# Patient Record
(demographics unavailable — no encounter records)

---

## 2025-04-22 NOTE — BIRTH HISTORY
[At Term] : at term [Normal Vaginal Route] : by normal vaginal route [FreeTextEntry1] : 8 pounds 5 ounces.

## 2025-04-22 NOTE — REASON FOR VISIT
[Initial Consultation] : an initial consultation for [Hyperactivity] : hyperactivity [Attention Problems] : attention problems [Learning Problems] : learning problems [Mother] : mother [Father] : father [IEP] : IEP [Rating scales] : rating scales [FreeTextEntry2] : mOTHER HAS CONCERNS  regarding his focus and attention difficulties. He has limited attention and needs to be constantly refocusesed. Parents are seeking evaluation and recommendations for interventions that can help with the improvment of his focus and  control of his hyperactive/impulsive  behaviors.

## 2025-04-22 NOTE — SOCIAL HISTORY
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

## 2025-04-22 NOTE — HISTORY OF PRESENT ILLNESS
[Public] : Public [ICT: _____] : Integrated Co-teaching class (Collaborative Team Teaching) [unfilled] [IEP] : Individualized Education Program [OT: ____] : Occupational Therapy [unfilled] [S-L: _____] : Speech/Language Therapy [unfilled] [Difficulty focusing in class] : difficulty focusing in class [Easily distracted] : easily distracted [Needs frequent redirection to finish tasks] : needs frequent redirection to finish tasks [Difficulty completing homework, needs supervision] : difficulty completing homework, needs supervision [Instructions often need to be repeated several times] : instructions often need to be repeated several times [Often loses belongings, misplaces books/assignments] : often loses belongings, misplaces books and/or assignments [Difficulty sitting still in class] : difficulty sitting still in class [Restless, fidgety] : restless, fidgety [Often playing with objects in hands] : often playing with objects in hands [Always "on the go"] : always "on the go" [Calls out in class, doesn't raise hand] : calls out in class, doesn't raise hand [Impatient, has trouble waiting for turn] : impatient, has trouble waiting for turn [Struggling academically] : struggling academically [Difficulty with math] : difficulty with math [Handwriting is sloppy or illegible] : handwriting is sloppy or illegible [Trouble expressing thoughts in writing] : trouble expressing thoughts in writing [Reads well, but has trouble with comprehension] : reads well, but has trouble with comprehension [Gets along well with other children] : gets along well with other children [Delayed Speech] : delayed speech [Delays in motor skills] : delays in motor skills [Poor handwriting] : poor handwriting [Plays with a variety of toys] :  plays with a variety of toys [Gets upset with changes in routines] : gets upset with changes in routines [Often seeks activity] : often seeks activity [Difficulty with Toilet training] : difficulty with toilet training [Becomes fixated on specific topics or interests for a period of time] : becomes fixated on specific topics or interests for a period of time [Difficulty following the daily routines at home] : no difficulties following the daily routines at home [Disruptive in class] : not disruptive in class [Easily frustrated] : not easily frustrated [Runs Off] : does not run off [Reacts physically when upset] : does not react physically when upset [Difficulty with transitions] : no difficulties with transitions [Oppositional] : not oppositional [Disrespectful, talks back to adults] : not disrespectful, does not talk back to adults [Difficult to discipline] : not difficult to discipline [Has frequent temper tantrums] : does not have frequent temper tantrums [Has hit other children] : has not hit other children [Physically aggressive] : not physically aggressive [Doesn't stay with the group during Teller time] : does stays with the group during Teller time [Behavior difficulties at school and home] : no behavior difficulties at school or home [Difficulty with reading] : no difficulties with reading [Difficulty making friends & getting] : no difficulties making friends and getting along with peers [Is very sensitive, upset easily] : is not very sensitive, does not upset easily [Seems sad often] : does not seem sad often [Perez] : not perez [Difficulty  from parents] : no difficulty  from parents [Seems nervous] : does not seem nervous [Worries about school performance] : does not worry about school performance [Worries what other children think] : does not worry about what other children think [Afraid to speak in front of class] : is not afraid to speak in front of class [Has many fears] : does not have many fears [Difficulty with sleep] : no difficulties with sleep [Wakes up screaming during the night, has night terrors] : does not wake up screaming during the night, does not have night terrors [Picky eater, eats a limited range of food] : not a picky eater, does not eat a very limited range of food [Frequently lines up toys or other items] : does not frequently line up toys or other items [Flaps hands] : does not flap hands [Spins things] : does not spin things [Makes unusual finger movements] : does not make unusual finger movements [Insists on things being the same] : does not insist on things being the same [Gets upset with loud sounds] : does not get upset with loud sounds [Sensitive to texture, only wear certain clothes] : not sensitive to texture, will not only wear certain clothes [Difficulty with bathing] : no difficulties with bathing [difficulty with brushing teeth] : no difficulties with brushing teeth [Difficulty with haircuts] :  no difficulties with haircuts [FreeTextEntry6] : His handwriting is improving with Occupational therapy [FreeTextEntry8] : Sometimes he cares about his school performance. [FreeTextEntry9] : His speech has improved significantly with speech therapy. [de-identified] : His speech has improved  significantly with speech therapy. [de-identified] : He sleeps early  and wakes up between 4:30 and 5:00am. His complaints of bad dreams has improved. [de-identified] : Day time toilet training was achieved at  age 3 years and the night time one was completed at age 4 years. [FreeTextEntry4] : [employer] [TWNoteComboBox1] : 1st Grade

## 2025-05-27 NOTE — HISTORY OF PRESENT ILLNESS
[FreeTextEntry4] : [employer] [TWNoteComboBox1] : 1st Grade [FreeTextEntry1] : Yovani parents returned with him to discuss our finding following his initiatl evaluation.